# Patient Record
(demographics unavailable — no encounter records)

---

## 2025-01-08 NOTE — PHYSICAL EXAM
[Alert] : alert [No Acute Distress] : no acute distress [Normocephalic] : normocephalic [EOMI Bilateral] : EOMI bilateral [Clear tympanic membranes with bony landmarks and light reflex present bilaterally] : clear tympanic membranes with bony landmarks and light reflex present bilaterally  [Pink Nasal Mucosa] : pink nasal mucosa [Nonerythematous Oropharynx] : nonerythematous oropharynx [Supple, full passive range of motion] : supple, full passive range of motion [No Palpable Masses] : no palpable masses [Clear to Auscultation Bilaterally] : clear to auscultation bilaterally [Regular Rate and Rhythm] : regular rate and rhythm [Normal S1, S2 audible] : normal S1, S2 audible [No Murmurs] : no murmurs [Soft] : soft [NonTender] : non tender [Non Distended] : non distended [No Hepatomegaly] : no hepatomegaly [No Splenomegaly] : no splenomegaly [Rubin: _____] : Rubin [unfilled] [No Abnormal Lymph Nodes Palpated] : no abnormal lymph nodes palpated [Normal Muscle Tone] : normal muscle tone [No Gait Asymmetry] : no gait asymmetry [No pain or deformities with palpation of bone, muscles, joints] : no pain or deformities with palpation of bone, muscles, joints [Straight] : straight [Cranial Nerves Grossly Intact] : cranial nerves grossly intact [No Rash or Lesions] : no rash or lesions

## 2025-01-08 NOTE — PHYSICAL EXAM
[Alert] : alert [No Acute Distress] : no acute distress [Normocephalic] : normocephalic [EOMI Bilateral] : EOMI bilateral [Clear tympanic membranes with bony landmarks and light reflex present bilaterally] : clear tympanic membranes with bony landmarks and light reflex present bilaterally  [Pink Nasal Mucosa] : pink nasal mucosa [Nonerythematous Oropharynx] : nonerythematous oropharynx [Supple, full passive range of motion] : supple, full passive range of motion [No Palpable Masses] : no palpable masses [Regular Rate and Rhythm] : regular rate and rhythm [Clear to Auscultation Bilaterally] : clear to auscultation bilaterally [Normal S1, S2 audible] : normal S1, S2 audible [No Murmurs] : no murmurs [Soft] : soft [NonTender] : non tender [Non Distended] : non distended [No Hepatomegaly] : no hepatomegaly [No Splenomegaly] : no splenomegaly [Rubin: _____] : Rubin [unfilled] [No Abnormal Lymph Nodes Palpated] : no abnormal lymph nodes palpated [Normal Muscle Tone] : normal muscle tone [No Gait Asymmetry] : no gait asymmetry [No pain or deformities with palpation of bone, muscles, joints] : no pain or deformities with palpation of bone, muscles, joints [Straight] : straight [Cranial Nerves Grossly Intact] : cranial nerves grossly intact [No Rash or Lesions] : no rash or lesions

## 2025-01-18 NOTE — HISTORY OF PRESENT ILLNESS
[Mother] : mother [Yes] : Patient goes to dentist yearly [Up to date] : Up to date [Eats meals with family] : eats meals with family [At least 1 hour of physical activity a day] : at least 1 hour of physical activity a day [No] : Patient has not had sexual intercourse [Sleep Concerns] : no sleep concerns [Uses electronic nicotine delivery system] : does not use electronic nicotine delivery system [Uses tobacco] : does not use tobacco [Uses drugs] : does not use drugs  [Has problems with sleep] : does not have problems with sleep [Drinks alcohol] : does not drink alcohol [Gets depressed, anxious, or irritable/has mood swings] : does not get depressed, anxious, or irritable/has mood swings [Has thought about hurting self or considered suicide] : has not thought about hurting self or considered suicide [FreeTextEntry7] : 13 Y[facility] [de-identified] : picky eater- not a big eater  [FreeTextEntry1] : parent/patient denies- night sweats, night pains,  unexplained weight loss, headache, chest pain, SOB, loss of energy, chronic joint pains patient has normal urine output and stooling concerns for eating and not gaining weight well and height stature   had recent GI virus that caused some weight loss

## 2025-01-18 NOTE — DISCUSSION/SUMMARY
[No Elimination Concerns] : elimination [Normal Sleep Pattern] : sleep [Anticipatory Guidance Given] : Anticipatory guidance addressed as per the history of present illness section [No Vaccines] : no vaccines needed [No Medications] : ~He/She~ is not on any medications [Mother] : mother [Full Activity without restrictions including Physical Education & Athletics] : Full Activity without restrictions including Physical Education & Athletics [de-identified] : slower growth than previous years  [de-identified] : speak with nutritionist  [FreeTextEntry1] : Continue and/or try to have a balanced diet with all food groups. Brush teeth twice a day with toothbrush. Recommend visit to dentist. Maintain consistent daily routines and sleep schedule. Risky behaviors assessed. School discussed. Try to limit screen time to no more than 2 hours per day. Encourage physical activity. Return 1 year for routine well visit check. coordination of care reviewed 5-2-1-0 reviewed cardiac checklist -reviewed  refer to endocrinologist - parents are not tall dad 5'8 mom 5'1 but patient coming off chart

## 2025-01-18 NOTE — RISK ASSESSMENT
[PHQ-9 Negative - No further assessment needed] : PHQ-9 Negative - No further assessment needed [Other reason not done] : Other reason not done

## 2025-01-18 NOTE — DISCUSSION/SUMMARY
[No Elimination Concerns] : elimination [Normal Sleep Pattern] : sleep [Anticipatory Guidance Given] : Anticipatory guidance addressed as per the history of present illness section [No Vaccines] : no vaccines needed [No Medications] : ~He/She~ is not on any medications [Mother] : mother [Full Activity without restrictions including Physical Education & Athletics] : Full Activity without restrictions including Physical Education & Athletics [de-identified] : speak with nutritionist  [de-identified] : slower growth than previous years  [FreeTextEntry1] : Continue and/or try to have a balanced diet with all food groups. Brush teeth twice a day with toothbrush. Recommend visit to dentist. Maintain consistent daily routines and sleep schedule. Risky behaviors assessed. School discussed. Try to limit screen time to no more than 2 hours per day. Encourage physical activity. Return 1 year for routine well visit check. coordination of care reviewed 5-2-1-0 reviewed cardiac checklist -reviewed  refer to endocrinologist - parents are not tall dad 5'8 mom 5'1 but patient coming off chart

## 2025-01-18 NOTE — HISTORY OF PRESENT ILLNESS
[Mother] : mother [Yes] : Patient goes to dentist yearly [Up to date] : Up to date [Eats meals with family] : eats meals with family [At least 1 hour of physical activity a day] : at least 1 hour of physical activity a day [No] : Patient has not had sexual intercourse [Sleep Concerns] : no sleep concerns [Uses electronic nicotine delivery system] : does not use electronic nicotine delivery system [Uses tobacco] : does not use tobacco [Uses drugs] : does not use drugs  [Drinks alcohol] : does not drink alcohol [Has problems with sleep] : does not have problems with sleep [Gets depressed, anxious, or irritable/has mood swings] : does not get depressed, anxious, or irritable/has mood swings [FreeTextEntry7] : 13 YR [facility] [Has thought about hurting self or considered suicide] : has not thought about hurting self or considered suicide [de-identified] : picky eater- not a big eater  [FreeTextEntry1] : parent/patient denies- night sweats, night pains,  unexplained weight loss, headache, chest pain, SOB, loss of energy, chronic joint pains patient has normal urine output and stooling concerns for eating and not gaining weight well and height stature   had recent GI virus that caused some weight loss

## 2025-02-25 NOTE — END OF VISIT
Called patient and left a message to call the office at [phone].    [FreeTextEntry3] : I, Darius Palacios MD, I personally performed the services described in the documentation, reviewed the documentation recorded by the scribe in my presence and it accurately and completely records my words and actions

## 2025-02-25 NOTE — REASON FOR VISIT
[Initial Evaluation] : an initial evaluation [Patient] : patient [Mother] : mother [FreeTextEntry1] : Left chronic ankle pain on/off

## 2025-02-25 NOTE — DATA REVIEWED
[de-identified] : Left Ankle  AP/lateral/oblique  X-rays: There is no fracture or cortical irregularity noted. The growth plates are open with no widening or irregularities of the growth plate. The mortise joint appears normal with no widening over the medial or lateral aspect of the joint. There is no OCD lesion noted.  There is no callus formation indicating a hidden healing fracture. There are no suspicious cysts or masses noted. No signs of osteopenia.   Left ankle MRI at [facility] on 2/325: + edema/micro trabecular fracture of the head of talus and navicular bone.

## 2025-02-25 NOTE — HISTORY OF PRESENT ILLNESS
[FreeTextEntry1] : Ace is an active 13-year-old boy seen-year-old boy who has a history of on and off mild left ankle discomfort which may have been due to a possible injury in June 2024 during soccer.  He has been playing soccer and all activities with no significant pain however he was evaluated by the primary care physician for a general physical and the PCP ordered an MRI of both  ankle. He is currently asymptomatic.

## 2025-02-25 NOTE — PHYSICAL EXAM
[FreeTextEntry1] : Pleasant and cooperative with exam, appropriate for age. Ambulates without evidence of antalgia and limp, good coordination and balance. AAOX3  Skin: No rashes noted.  Eyes: Both conjunctiva, eyelids and pupils are present.  ENT:  Both ears, nose and lips are present. No nasal congestion.  Resp: No cough or wheezing noted.  Left Ankle: Full active and passive range of motion of the ankle. There is no edema, ecchymosis or erythema noted over the ankle. 2+ pulses palpated. Capillary refill +1 in all toes. No lymphedema. Skin is warm and intact. Neurologically intact with intact Achilles DTR. Muscle strength 5/5. There is no pain elicited with palpation over the lateral, medial and posterior malleolus. There is no discomfort noted over the anterior aspect of the ankle. Negative anterior drawer sign. No pain elicited with palpation over the anterior, posterior tibiofibular ligament along with the deltoid ligament.. Good flexibility of the Achilles tendon with the knee in flexion and extension. The joint is stable with stress maneuvers.

## 2025-02-25 NOTE — ASSESSMENT
[FreeTextEntry1] : Ace is a 13 year old boy who has on/off mild left ankle pain following stress fracture  , currently asymptomatic. Today's assessment was performed with the assistance of the patient's parent as an independent historian as the patient's history is unreliable.  The radiographs obtained today were reviewed with both the parent and patient confirming normal left ankle x rays. + eva edema via the MRI via the talar head and navicular.  The recommendation at this time would be to continue with all activities as tolerated and P.T. If the pain increased in frequency or intensity, he may follow up for a repeat examination otherwise follow up on a PRN basis.  We had a thorough talk in regard to the diagnosis, prognosis and treatment modalities.  All questions and concerns were addressed today. There was a verbal understanding from the parents and patient.   TATIANNA Mas have acted as a scribe and documented the above information for Dr. Palacios.   This note was generated using Dragon medical dictation software. A reasonable effort has been made for proofreading its contents; however, typos may still remain. If there are any questions or points of clarification needed, please do not hesitate to contact my office.   The above documentation completed by the scribe is an accurate record of both my words and actions.   Dr. Palacios.

## 2025-02-25 NOTE — REVIEW OF SYSTEMS
[Change in Activity] : no change in activity [Fever Above 102] : no fever [Redness] : no redness [Wheezing] : no wheezing [Vomiting] : no vomiting [Limping] : no limping [Joint Pains] : no arthralgias [Joint Swelling] : no joint swelling